# Patient Record
Sex: FEMALE | Race: NATIVE HAWAIIAN OR OTHER PACIFIC ISLANDER | HISPANIC OR LATINO | Employment: UNEMPLOYED | URBAN - METROPOLITAN AREA
[De-identification: names, ages, dates, MRNs, and addresses within clinical notes are randomized per-mention and may not be internally consistent; named-entity substitution may affect disease eponyms.]

---

## 2023-11-27 ENCOUNTER — TELEPHONE (OUTPATIENT)
Dept: OBSTETRICS AND GYNECOLOGY | Facility: CLINIC | Age: 28
End: 2023-11-27
Payer: MEDICAID

## 2023-11-27 NOTE — TELEPHONE ENCOUNTER
Spoke to patients , advised Dr Reynoso is not currently accepting new patients.   Offered her to see the nurse practitioner, she agreed. Patient is scheduled for tomorrow.

## 2023-11-27 NOTE — TELEPHONE ENCOUNTER
----- Message from Cyndie Art sent at 11/27/2023  2:05 PM CST -----  Patient is calling to establish care please call her back at [phone].               Thanks  higinio

## 2023-11-28 ENCOUNTER — OFFICE VISIT (OUTPATIENT)
Dept: OBSTETRICS AND GYNECOLOGY | Facility: CLINIC | Age: 28
End: 2023-11-28
Payer: MEDICAID

## 2023-11-28 VITALS — WEIGHT: 123.19 LBS | DIASTOLIC BLOOD PRESSURE: 64 MMHG | HEART RATE: 79 BPM | SYSTOLIC BLOOD PRESSURE: 94 MMHG

## 2023-11-28 DIAGNOSIS — N92.1 MENORRHAGIA WITH IRREGULAR CYCLE: Primary | ICD-10-CM

## 2023-11-28 PROCEDURE — 99203 PR OFFICE/OUTPT VISIT, NEW, LEVL III, 30-44 MIN: ICD-10-PCS | Mod: S$GLB,,,

## 2023-11-28 PROCEDURE — 3078F DIAST BP <80 MM HG: CPT | Mod: CPTII,S$GLB,,

## 2023-11-28 PROCEDURE — 1159F MED LIST DOCD IN RCRD: CPT | Mod: CPTII,S$GLB,,

## 2023-11-28 PROCEDURE — 3074F SYST BP LT 130 MM HG: CPT | Mod: CPTII,S$GLB,,

## 2023-11-28 PROCEDURE — 1159F PR MEDICATION LIST DOCUMENTED IN MEDICAL RECORD: ICD-10-PCS | Mod: CPTII,S$GLB,,

## 2023-11-28 PROCEDURE — 3078F PR MOST RECENT DIASTOLIC BLOOD PRESSURE < 80 MM HG: ICD-10-PCS | Mod: CPTII,S$GLB,,

## 2023-11-28 PROCEDURE — 99203 OFFICE O/P NEW LOW 30 MIN: CPT | Mod: S$GLB,,,

## 2023-11-28 PROCEDURE — 3074F PR MOST RECENT SYSTOLIC BLOOD PRESSURE < 130 MM HG: ICD-10-PCS | Mod: CPTII,S$GLB,,

## 2023-11-28 NOTE — PROGRESS NOTES
Subjective:      Patient ID: Barrie Tarango is a 27 y.o. female who presents for evaluation today.    Chief Complaint:    Contraception (Pt states that she's had Nexplanon for one year. She's been bleeding pretty much since having the nexplanon inserted. It stopped for a few days in July, but then started back up again. She started heavily bleeding yesterday with a lot of clots so her  brought her to the hospital. They gave her a medicine that has pretty much stopped the bleeding. She is only bleeding a little bit now, but the hospital told her to follow up with an OBGYN.)      History of Present Illness  HPI She had Nexplanon placed 1 year ago, has had irregular bleeding ever since having is placed. She began having heavy bleeding with clots this weekend, which brought her the ER. She was treated with an IV medication and the bleeding has since lightened. She reports today her bleeding has lightened, cramping is minimal. Prior to nexplanon, her periods lasted 5 days, with heavy flow, she would change pads every 2 hours with clots & cramping. She desires permanent birth control, as she has 4 children. She had 3 NVDs, 1  for her second twin.     GYN History  No LMP recorded (lmp unknown). Patient has had an implant.   Date of Last Pap: N/A    VITALS  BP 94/64   Pulse 79   Wt 55.9 kg (123 lb 3.2 oz)   LMP  (LMP Unknown)   Weight: 55.9 kg (123 lb 3.2 oz)         PAST MEDICAL HISTORY  History reviewed. No pertinent past medical history.    PAST SURGICAL HISTORY  Past Surgical History:   Procedure Laterality Date     SECTION         SOCIAL HISTORY  Social History     Tobacco Use   Smoking Status Never   Smokeless Tobacco Never   ,   Social History     Substance and Sexual Activity   Alcohol Use Not Currently        MEDICATIONS  No outpatient medications have been marked as taking for the 23 encounter (Office Visit) with Vivi Wood NP.         Review of Systems   Review of Systems    Constitutional:  Negative for activity change.   Eyes:  Negative for visual disturbance.   Respiratory:  Negative for shortness of breath.    Cardiovascular:  Negative for chest pain.   Gastrointestinal:  Negative for abdominal pain.   Genitourinary:  Positive for vaginal bleeding.        No abnormal vaginal bleeding   Musculoskeletal:  Negative for back pain.   Integumentary:  Negative for rash and breast mass.   Neurological:  Negative for numbness.   Psychiatric/Behavioral:          No mood disturbance or changes    Breast: Negative for mass          Objective:     Physical Exam:   Constitutional: She is oriented to person, place, and time. She appears well-developed. No distress.       Cardiovascular:  Normal rate.             Pulmonary/Chest: Effort normal. No respiratory distress.        Abdominal: Soft. She exhibits no distension. There is no abdominal tenderness.     Genitourinary:    Vagina and uterus normal.      Pelvic exam was performed with patient supine.   The external female genitalia was normal.   Labial bartholins normal.There is no tenderness or lesion on the right labia. There is no tenderness or lesion on the left labia. Cervix is normal. Right adnexum displays no tenderness and no fullness. Left adnexum displays no tenderness and no fullness. No erythema,  no vaginal discharge or bleeding in the vagina.    No foreign body in the vagina.   Uterus is not enlarged and not tender.           Musculoskeletal: Moves all extremeties.       Neurological: She is alert and oriented to person, place, and time.    Skin: Skin is warm and dry.    Psychiatric: She has a normal mood and affect. Her behavior is normal.          Assessment:        1. Menorrhagia with irregular cycle       Menorrhagia with irregular cycle  -     US OB/GYN Procedure ([facility]); Future  -     POCT urine pregnancy       Plan:     Patient instructed to contact the clinic should any questions or concerns arise prior to her next  office visit. Patient is happy with the plan of care at this time, verbalizes understanding and denies outstanding questions.      Birth Control options discussed, such as pills, IUDs, tubal, ablation, etc  She and her partner would like to move forward with tubal/ablation  Discussed surgical referral to Dr. Reynoso, post US results  If you don't hear from the office regarding results within 1 week, please call  Follow up in 1 year for annual or sooner as needed  Chaperone present for exam

## 2023-11-29 ENCOUNTER — PROCEDURE VISIT (OUTPATIENT)
Dept: OBSTETRICS AND GYNECOLOGY | Facility: CLINIC | Age: 28
End: 2023-11-29
Payer: MEDICAID

## 2023-11-29 DIAGNOSIS — N92.1 MENORRHAGIA WITH IRREGULAR CYCLE: ICD-10-CM

## 2023-11-29 PROCEDURE — 76856 US EXAM PELVIC COMPLETE: CPT | Mod: S$GLB,,, | Performed by: OBSTETRICS & GYNECOLOGY

## 2023-11-29 PROCEDURE — 76856 US OB/GYN PROCEDURE (VIEWPOINT): ICD-10-PCS | Mod: S$GLB,,, | Performed by: OBSTETRICS & GYNECOLOGY

## 2023-12-04 DIAGNOSIS — Z30.2 ENCOUNTER FOR STERILIZATION: ICD-10-CM

## 2023-12-04 DIAGNOSIS — Z30.46 ENCOUNTER FOR NEXPLANON REMOVAL: ICD-10-CM

## 2023-12-04 DIAGNOSIS — N92.1 MENORRHAGIA WITH IRREGULAR CYCLE: Primary | ICD-10-CM

## 2023-12-20 ENCOUNTER — OFFICE VISIT (OUTPATIENT)
Dept: OBSTETRICS AND GYNECOLOGY | Facility: CLINIC | Age: 28
End: 2023-12-20
Payer: MEDICAID

## 2023-12-20 VITALS — WEIGHT: 123 LBS | HEART RATE: 82 BPM | DIASTOLIC BLOOD PRESSURE: 66 MMHG | SYSTOLIC BLOOD PRESSURE: 99 MMHG

## 2023-12-20 DIAGNOSIS — N92.1 MENORRHAGIA WITH IRREGULAR CYCLE: ICD-10-CM

## 2023-12-20 DIAGNOSIS — Z01.419 WELL WOMAN EXAM WITH ROUTINE GYNECOLOGICAL EXAM: Primary | ICD-10-CM

## 2023-12-20 DIAGNOSIS — Z11.3 ENCOUNTER FOR SCREENING FOR INFECTIONS WITH PREDOMINANTLY SEXUAL MODE OF TRANSMISSION: ICD-10-CM

## 2023-12-20 PROCEDURE — 1160F PR REVIEW ALL MEDS BY PRESCRIBER/CLIN PHARMACIST DOCUMENTED: ICD-10-PCS | Mod: CPTII,S$GLB,,

## 2023-12-20 PROCEDURE — 3078F DIAST BP <80 MM HG: CPT | Mod: CPTII,S$GLB,,

## 2023-12-20 PROCEDURE — 3078F PR MOST RECENT DIASTOLIC BLOOD PRESSURE < 80 MM HG: ICD-10-PCS | Mod: CPTII,S$GLB,,

## 2023-12-20 PROCEDURE — 99395 PREV VISIT EST AGE 18-39: CPT | Mod: S$GLB,,,

## 2023-12-20 PROCEDURE — 1160F RVW MEDS BY RX/DR IN RCRD: CPT | Mod: CPTII,S$GLB,,

## 2023-12-20 PROCEDURE — 99395 PR PREVENTIVE VISIT,EST,18-39: ICD-10-PCS | Mod: S$GLB,,,

## 2023-12-20 PROCEDURE — 1159F PR MEDICATION LIST DOCUMENTED IN MEDICAL RECORD: ICD-10-PCS | Mod: CPTII,S$GLB,,

## 2023-12-20 PROCEDURE — 1159F MED LIST DOCD IN RCRD: CPT | Mod: CPTII,S$GLB,,

## 2023-12-20 PROCEDURE — 3074F PR MOST RECENT SYSTOLIC BLOOD PRESSURE < 130 MM HG: ICD-10-PCS | Mod: CPTII,S$GLB,,

## 2023-12-20 PROCEDURE — 3074F SYST BP LT 130 MM HG: CPT | Mod: CPTII,S$GLB,,

## 2023-12-20 RX ORDER — NORETHINDRONE 5 MG/1
TABLET ORAL
Qty: 44 TABLET | Refills: 0 | Status: SHIPPED | OUTPATIENT
Start: 2023-12-20

## 2023-12-20 NOTE — PROGRESS NOTES
Subjective:      Patient ID: Barrie Tarango is a 28 y.o. female who presents for evaluation today.    Chief Complaint:    Well Woman and Menorrhagia (Pt has been bleeding for over one year since she got her Nexplanon inserted.)      History of Present Illness  HPI  Annual Exam (Premenopausal) - Patient presents for annual exam. The patient also has complaints of bleeding with nexplanon. The patient is sexually active. GYN screening history: last pap: patient does not recall results of last pap. The patient wears seatbelts: yes. The patient participates in regular exercise: not asked. Has the patient ever been transfused or tattooed?: not asked. The patient reports that there is not domestic violence in her life. She had nexplanon placed a year ago, since then she has had irregular bleeding. She reports small amount of spotting daily, wears thin panty liner. She denies pelvic pain. She desires a tubal & ablation, is awaiting scheduling. She denies bowel or bladder problems.     GYN History  No LMP recorded (lmp unknown). Patient has had an implant.   Date of Last Pap: Pap smear completed today with HPV co-testing    VITALS  BP 99/66   Pulse 82   Wt 55.8 kg (123 lb)   LMP  (LMP Unknown)   Weight: 55.8 kg (123 lb)         PAST MEDICAL HISTORY  History reviewed. No pertinent past medical history.    PAST SURGICAL HISTORY  Past Surgical History:   Procedure Laterality Date     SECTION         SOCIAL HISTORY  Social History     Tobacco Use   Smoking Status Never   Smokeless Tobacco Never   ,   Social History     Substance and Sexual Activity   Alcohol Use Not Currently        MEDICATIONS  No outpatient medications have been marked as taking for the 23 encounter (Office Visit) with Vivi Wood NP.         Review of Systems   Review of Systems   Constitutional:  Negative for activity change.   Eyes:  Negative for visual disturbance.   Respiratory:  Negative for shortness of breath.    Cardiovascular:   Negative for chest pain.   Gastrointestinal:  Negative for abdominal pain.   Genitourinary:  Positive for vaginal bleeding.        No abnormal vaginal bleeding   Musculoskeletal:  Negative for back pain.   Integumentary:  Negative for rash and breast mass.   Neurological:  Negative for numbness.   Psychiatric/Behavioral:          No mood disturbance or changes    Breast: Negative for mass          Objective:     Physical Exam:   Constitutional: She is oriented to person, place, and time. She appears well-developed. She is cooperative.    HENT:   Head: Normocephalic.     Neck: Trachea normal. No thyromegaly present.    Cardiovascular:  Normal rate, regular rhythm and normal heart sounds.             Pulmonary/Chest: Effort normal and breath sounds normal. Right breast exhibits no mass, no nipple discharge and no skin change. Left breast exhibits no mass, no nipple discharge and no skin change.        Abdominal: Soft. There is no abdominal tenderness. There is no rebound and no guarding.     Genitourinary:    Vagina and uterus normal.      Pelvic exam was performed with patient supine.   The external female genitalia was normal.     Labial bartholins normal.There is no lesion on the right labia. There is no lesion on the left labia. Cervix is normal. Right adnexum displays no mass and no tenderness. Left adnexum displays no mass and no tenderness. Cervix exhibits no discharge.    pap smear completedUterus is not enlarged and not tender.              Lymphadenopathy:        Head (right side): No submental and no submandibular adenopathy present.        Head (left side): No submental and no submandibular adenopathy present.     She has no cervical adenopathy.    Neurological: She is alert and oriented to person, place, and time.    Skin: Skin is warm.    Psychiatric: She has a normal mood and affect. Her speech is normal and behavior is normal. Thought content normal.          Assessment:        1. Well woman exam with  routine gynecological exam    2. Encounter for screening for infections with predominantly sexual mode of transmission    3. Menorrhagia with irregular cycle       Well woman exam with routine gynecological exam  -     Liquid-based pap smear, screening    Encounter for screening for infections with predominantly sexual mode of transmission  -     C. trachomatis/N. gonorrhoeae by AMP DNA Other; Cervicovaginal    Menorrhagia with irregular cycle  -     norethindrone (AYGESTIN) 5 mg Tab; Take q6 hours for 24 hours, then twice daily for remainder of pack  Dispense: 44 tablet; Refill: 0  -     POCT urine pregnancy         Plan:     Patient instructed to contact the clinic should any questions or concerns arise prior to her next office visit. Patient is happy with the plan of care at this time, verbalizes understanding and denies outstanding questions.      Pap  Self-breast exams  Ablation & tubal scheduled with F[facility] for 1/31  Offered removal of Nexplanon, she would like to keep in until surgery  Discussed trying norethindrone to help bleeding in the meantime  If you don't hear from the office regarding results within 1 week, please call  Follow up in 1 year for annual or sooner as needed  Chaperone present for exam      [facility]  was used for the entirety of the visit

## 2023-12-22 LAB
CHLAMYDIA: NEGATIVE
GONORRHEA: NEGATIVE
SOURCE: NORMAL

## 2023-12-28 LAB — Lab: NORMAL

## 2024-01-23 ENCOUNTER — OFFICE VISIT (OUTPATIENT)
Dept: OBSTETRICS AND GYNECOLOGY | Facility: CLINIC | Age: 29
End: 2024-01-23
Payer: MEDICAID

## 2024-01-23 VITALS — HEART RATE: 89 BPM | WEIGHT: 127 LBS | SYSTOLIC BLOOD PRESSURE: 112 MMHG | DIASTOLIC BLOOD PRESSURE: 74 MMHG

## 2024-01-23 DIAGNOSIS — Z98.890 POST-OPERATIVE STATE: ICD-10-CM

## 2024-01-23 DIAGNOSIS — N94.6 DYSMENORRHEA: Primary | ICD-10-CM

## 2024-01-23 DIAGNOSIS — N92.1 MENORRHAGIA WITH IRREGULAR CYCLE: ICD-10-CM

## 2024-01-23 LAB
APPEARANCE, UA: CLEAR
BACTERIA SPEC CULT: ABNORMAL /HPF
BASOPHILS NFR BLD: 0.6 % (ref 0–3)
BILIRUB UR QL STRIP: NEGATIVE MG/DL
COLOR UR: ABNORMAL
EOSINOPHIL NFR BLD: 2.4 % (ref 1–3)
ERYTHROCYTE [DISTWIDTH] IN BLOOD BY AUTOMATED COUNT: 12.7 % (ref 12.5–18)
GLUCOSE (UA): NORMAL MG/DL
HCT VFR BLD AUTO: 39 % (ref 37–47)
HGB BLD-MCNC: 13.4 G/DL (ref 12–16)
HGB UR QL STRIP: 250 /UL
KETONES UR QL STRIP: NEGATIVE MG/DL
LEUKOCYTE ESTERASE UR QL STRIP: NEGATIVE /UL
LYMPHOCYTES NFR BLD: 36 % (ref 25–40)
MCH RBC QN AUTO: 31 PG (ref 27–31.2)
MCHC RBC AUTO-ENTMCNC: 34.4 G/DL (ref 31.8–35.4)
MCV RBC AUTO: 90.3 FL (ref 80–97)
MONOCYTES NFR BLD: 8.1 % (ref 1–15)
NEUTROPHILS # BLD AUTO: 3.52 10*3/UL (ref 1.8–7.7)
NEUTROPHILS NFR BLD: 52.8 % (ref 37–80)
NITRITE UR QL STRIP: NEGATIVE
NUCLEATED RED BLOOD CELLS: 0 %
PH UR STRIP: 6 PH (ref 5–9)
PLATELETS: 326 10*3/UL (ref 142–424)
PROT UR QL STRIP: NEGATIVE MG/DL
RBC # BLD AUTO: 4.32 10*6/UL (ref 4.2–5.4)
RBC #/AREA URNS HPF: ABNORMAL /HPF (ref 0–2)
RPR: NON REACTIVE
SERVICE COMMENT 03: ABNORMAL
SP GR UR STRIP: 1.02 (ref 1–1.03)
SPECIMEN COLLECTION METHOD, URINE: ABNORMAL
SQUAMOUS EPITHELIAL, UA: ABNORMAL /LPF
UROBILINOGEN UR STRIP-ACNC: NORMAL MG/DL
WBC # BLD: 6.7 10*3/UL (ref 4.6–10.2)
WBC #/AREA URNS HPF: ABNORMAL /HPF (ref 0–5)

## 2024-01-23 PROCEDURE — 3074F SYST BP LT 130 MM HG: CPT | Mod: CPTII,S$GLB,, | Performed by: OBSTETRICS & GYNECOLOGY

## 2024-01-23 PROCEDURE — 1159F MED LIST DOCD IN RCRD: CPT | Mod: CPTII,S$GLB,, | Performed by: OBSTETRICS & GYNECOLOGY

## 2024-01-23 PROCEDURE — 99213 OFFICE O/P EST LOW 20 MIN: CPT | Mod: S$GLB,,, | Performed by: OBSTETRICS & GYNECOLOGY

## 2024-01-23 PROCEDURE — 3078F DIAST BP <80 MM HG: CPT | Mod: CPTII,S$GLB,, | Performed by: OBSTETRICS & GYNECOLOGY

## 2024-01-23 RX ORDER — OXYCODONE AND ACETAMINOPHEN 5; 325 MG/1; MG/1
1 TABLET ORAL EVERY 4 HOURS PRN
Qty: 18 TABLET | Refills: 0 | Status: SHIPPED | OUTPATIENT
Start: 2024-01-23

## 2024-01-23 RX ORDER — PROMETHAZINE HYDROCHLORIDE 25 MG/1
25 TABLET ORAL EVERY 6 HOURS PRN
Qty: 8 TABLET | Refills: 1 | Status: SHIPPED | OUTPATIENT
Start: 2024-01-23

## 2024-01-23 RX ORDER — NORETHINDRONE 0.35 MG/1
1 TABLET ORAL DAILY
Qty: 30 TABLET | Refills: 11 | Status: SHIPPED | OUTPATIENT
Start: 2024-01-23 | End: 2025-01-22

## 2024-01-23 RX ORDER — IBUPROFEN 600 MG/1
600 TABLET ORAL 3 TIMES DAILY
Qty: 18 TABLET | Refills: 1 | Status: SHIPPED | OUTPATIENT
Start: 2024-01-23

## 2024-01-23 NOTE — PROGRESS NOTES
Patient ID: Barrie Tarango is a 28 y.o. female who presents for evaluation today.      Chief Complaint:    Well Woman and Menorrhagia (Pt has been bleeding for over one year since she got her Nexplanon inserted.)        History of Present Illness   she was seen by the NP and has been set for an ablation discussion  her  is with her.      the  was available.  The patient has both menorrhagia and dysmenorrhea.   She was initially considering an ablation however after a thorough discussion regarding ablation or TLH the pt and her sig other are requesting a definitive approach.  She has 4 children and understands that she cannot become pregnancy with a hyst or ablation.   With the assistance of the  she has elected to move forward with a TLH   the R&B were discussed in detail and consents are signed.     The pt does not speak English.  Again with the  it was disucssed that not only does she have menorrhagia but dysmenorrhea and pelvic pain with occ dyspareunia.  After a thorough discussion with the  both the ablation and the TLH were discussed in detail  the patient and her  would like to move forward with a Hysterectomy       GYN History  No LMP recorded (lmp unknown). Patient has had an implant.   Date of Last Pap: Pap smear completed today with HPV co-testing     VITALS  BP 99/66   Pulse 82   Wt 55.8 kg (123 lb)   LMP  (LMP Unknown)   Weight: 55.8 kg (123 lb)          PAST MEDICAL HISTORY  History reviewed. No pertinent past medical history.     PAST SURGICAL HISTORY        Past Surgical History:   Procedure Laterality Date     SECTION             SOCIAL HISTORY  Social History          Tobacco Use   Smoking Status Never   Smokeless Tobacco Never   ,   Social History          Substance and Sexual Activity   Alcohol Use Not Currently         MEDICATIONS  No outpatient medications have been marked as taking for the 23 encounter (Office Visit)  with Vivi Wood NP.            Review of Systems   Review of Systems   Constitutional:  Negative for activity change.   Eyes:  Negative for visual disturbance.   Respiratory:  Negative for shortness of breath.    Cardiovascular:  Negative for chest pain.   Gastrointestinal:  Negative for abdominal pain.   Genitourinary:  Positive for vaginal bleeding. Dysmenorrhea   pelvic pain dyspareunia       No abnormal vaginal bleeding   Musculoskeletal:  Negative for back pain.   Integumentary:  Negative for rash and breast mass.   Neurological:  Negative for numbness.   Psychiatric/Behavioral:          No mood disturbance or changes    Breast: Negative for mass        Objective:       Objective   Physical Exam:   Constitutional: She is oriented to person, place, and time. She appears well-developed. She is cooperative.    HENT:   Head: Normocephalic.     Neck: Trachea normal. No thyromegaly present.    Cardiovascular:  Normal rate, regular rhythm and normal heart sounds.             Pulmonary/Chest: Effort normal and breath sounds normal. Right breast exhibits no mass, no nipple discharge and no skin change. Left breast exhibits no mass, no nipple discharge and no skin change.         Abdominal: Soft. There is no abdominal tenderness. There is no rebound and no guarding.     Genitourinary:    Vagina and uterus normal.      Pelvic exam was performed with patient supine.   The external female genitalia was normal.     Labial bartholins normal.There is no lesion on the right labia. There is no lesion on the left labia. Cervix is normal. Right adnexum displays no mass and no tenderness. Left adnexum displays no mass and no tenderness. Cervix exhibits no discharge.    pap smear completedUterus is not enlarged and not tender.              Lymphadenopathy:        Head (right side): No submental and no submandibular adenopathy present.        Head (left side): No submental and no submandibular adenopathy present.     She has  no cervical adenopathy.    Neurological: She is alert and oriented to person, place, and time.    Skin: Skin is warm.    Psychiatric: She has a normal mood and affect. Her speech is normal and behavior is normal. Thought content normal.            Assessment:         Assessmen  1. Menorrhagia with irregular cycle     2.     Dysmenorrhea    3.      Pelvic pain    Plan  TLH Bilateral salpingectomy and cysto  also remove the nexplanon    R&B discussed  in detail   and thoroughly with the aid of the  all quesitons answered   45 min was spent with the pt and  face to face discussion options

## 2024-01-31 ENCOUNTER — OUTSIDE PLACE OF SERVICE (OUTPATIENT)
Dept: OBSTETRICS AND GYNECOLOGY | Facility: CLINIC | Age: 29
End: 2024-01-31
Payer: MEDICAID

## 2024-01-31 ENCOUNTER — OUTSIDE PLACE OF SERVICE (OUTPATIENT)
Dept: OBSTETRICS AND GYNECOLOGY | Facility: CLINIC | Age: 29
End: 2024-01-31

## 2024-01-31 LAB — B-HCG UR QL: NEGATIVE

## 2024-01-31 PROCEDURE — 58571 TLH W/T/O 250 G OR LESS: CPT | Mod: ,,, | Performed by: OBSTETRICS & GYNECOLOGY

## 2024-01-31 PROCEDURE — 58571 TLH W/T/O 250 G OR LESS: CPT | Mod: 80,,, | Performed by: OBSTETRICS & GYNECOLOGY

## 2024-01-31 PROCEDURE — 11982 REMOVE DRUG IMPLANT DEVICE: CPT | Mod: 51,,, | Performed by: OBSTETRICS & GYNECOLOGY

## 2024-02-01 ENCOUNTER — TELEPHONE (OUTPATIENT)
Dept: OBSTETRICS AND GYNECOLOGY | Facility: CLINIC | Age: 29
End: 2024-02-01
Payer: MEDICAID

## 2024-03-14 ENCOUNTER — OFFICE VISIT (OUTPATIENT)
Dept: OBSTETRICS AND GYNECOLOGY | Facility: CLINIC | Age: 29
End: 2024-03-14
Payer: MEDICAID

## 2024-03-14 VITALS — WEIGHT: 130 LBS | HEART RATE: 76 BPM | SYSTOLIC BLOOD PRESSURE: 102 MMHG | DIASTOLIC BLOOD PRESSURE: 66 MMHG

## 2024-03-14 DIAGNOSIS — Z98.890 POST-OPERATIVE STATE: Primary | ICD-10-CM

## 2024-03-14 PROCEDURE — 3074F SYST BP LT 130 MM HG: CPT | Mod: CPTII,S$GLB,, | Performed by: OBSTETRICS & GYNECOLOGY

## 2024-03-14 PROCEDURE — 3078F DIAST BP <80 MM HG: CPT | Mod: CPTII,S$GLB,, | Performed by: OBSTETRICS & GYNECOLOGY

## 2024-03-14 PROCEDURE — 99024 POSTOP FOLLOW-UP VISIT: CPT | Mod: S$GLB,,, | Performed by: OBSTETRICS & GYNECOLOGY

## 2024-03-14 PROCEDURE — 1159F MED LIST DOCD IN RCRD: CPT | Mod: CPTII,S$GLB,, | Performed by: OBSTETRICS & GYNECOLOGY

## 2024-03-14 NOTE — PROGRESS NOTES
Subjective:       Patient ID: Barrie Tarango is a 28 y.o. female.    Chief Complaint:  Post-op Evaluation      History of Present Illness  She is doing well.  No new health issues,  No abnormal bleeding, No GI or Gu concerns,  No dyspareunia  Status post .TLH doing very well  no concerns        GYN & OB History  No LMP recorded (lmp unknown). Patient has had a hysterectomy.     Date of Last Pap: No result found    OB History    Para Term  AB Living   3         4   SAB IAB Ectopic Multiple Live Births                  # Outcome Date GA Lbr Herve/2nd Weight Sex Delivery Anes PTL Lv   3       CS-Unspec      2       Vag-Spont      1       Vag-Spont          Review of Systems  Review of Systems          Objective:    Physical Exam:   Constitutional: She appears well-developed.             Abdominal: Soft.   Incisions dry and intact     Genitourinary:    Vagina normal.            Pelvic exam was performed with patient supine.     Labial bartholins normal.There is no tenderness or lesion on the right labia. There is no tenderness or lesion on the left labia. Cervix is absent.Uterus is absent.    Genitourinary Comments: The vaginal cuff is intact and healing nicely  A very small skin tag is removed at the pt request  no concerns   I discussed it was fleshy and not a mole                 Neurological: She is alert.     Psychiatric: She has a normal mood and affect. Her speech is normal and behavior is normal.        Assessment:        1. Post-operative state                 Plan:           Rtn PRN   Pt is aware we call all results. If she does not hear from our office regarding her result within a week of having a study or procedure performed she is to call the office so that we can research the result for her.  Normal activities discussed.   She is to return for any reason.      Chaperone was present

## 2024-10-01 ENCOUNTER — TELEPHONE (OUTPATIENT)
Dept: OBSTETRICS AND GYNECOLOGY | Facility: CLINIC | Age: 29
End: 2024-10-01
Payer: MEDICAID

## 2024-10-01 DIAGNOSIS — R39.89 URINARY PROBLEM: Primary | ICD-10-CM

## 2024-10-01 NOTE — TELEPHONE ENCOUNTER
----- Message from [employer] sent at 9/30/2024 11:47 AM CDT -----  Contact:  [phone]  Type:  Needs Medical Advice    Who Called: Barrie   Symptoms (please be specific):  f/u after uterus removal   Would the patient rather a call back or a response via [employer]? Call back   Best Call Back Number: [phone]  Additional Information: pt is experiencing itching a little burning

## 2024-10-01 NOTE — TELEPHONE ENCOUNTER
Spoke to patients  as patient does not speak english. He states the patient c/o pelvic pain and vaginal burning. Advised the patients  to take her to the pathology lab to do a urinalyis and we will contact them with results once they are available. If the results show an infection we will treat, if the results are negative, we will make her an appt for evaluation.   Patients  verbalized understanding.  U/A ordered and faxed.